# Patient Record
Sex: FEMALE | Race: WHITE | NOT HISPANIC OR LATINO | URBAN - NONMETROPOLITAN AREA
[De-identification: names, ages, dates, MRNs, and addresses within clinical notes are randomized per-mention and may not be internally consistent; named-entity substitution may affect disease eponyms.]

---

## 2018-05-30 PROBLEM — H25.13: Noted: 2021-05-19

## 2018-05-30 PROBLEM — H52.223: Noted: 2018-05-30

## 2020-10-05 NOTE — PATIENT DISCUSSION
Pt has had treatment for sinus and allergy issues which are suspected cause. Ed pt that I do not see any signs of ocular effects for HA, but suggested she does F/U with the CT scan when ready for thoroughness. Suggested to pt that she see neuro-ophthalmologist if persits. I would make a referral for her PRN.

## 2021-05-19 ENCOUNTER — IMPORTED ENCOUNTER (OUTPATIENT)
Dept: URBAN - NONMETROPOLITAN AREA CLINIC 1 | Facility: CLINIC | Age: 46
End: 2021-05-19

## 2021-05-19 PROCEDURE — 92015 DETERMINE REFRACTIVE STATE: CPT

## 2021-05-19 PROCEDURE — 92014 COMPRE OPH EXAM EST PT 1/>: CPT

## 2021-05-19 NOTE — PATIENT DISCUSSION
Myopia / Astigmatism OU- Discussed diagnosis in detail with patient - New glasses Rx given today- Optos done today WNL OU - Continue to monitor- RTC 1 year complete Cataracts OU- Discussed diagnosis in detail with patient- Discussed signs and symptoms of progression- Discussed UV protection- No treatment needed ta this time - Continue to monitor; Dr's Notes: Optos 5/30/18MR 5/30/18

## 2022-04-09 ASSESSMENT — TONOMETRY
OS_IOP_MMHG: 14
OD_IOP_MMHG: 14

## 2022-04-09 ASSESSMENT — VISUAL ACUITY
OD_CC: 20/30-1
OS_PH: 20/30-1
OS_CC: 20/60-1